# Patient Record
Sex: FEMALE
[De-identification: names, ages, dates, MRNs, and addresses within clinical notes are randomized per-mention and may not be internally consistent; named-entity substitution may affect disease eponyms.]

---

## 2019-12-04 ENCOUNTER — HOSPITAL ENCOUNTER (OUTPATIENT)
Dept: HOSPITAL 92 - BICULT | Age: 34
Discharge: HOME | End: 2019-12-04
Attending: OBSTETRICS & GYNECOLOGY
Payer: COMMERCIAL

## 2019-12-04 DIAGNOSIS — N93.9: Primary | ICD-10-CM

## 2019-12-04 DIAGNOSIS — Z30.09: ICD-10-CM

## 2019-12-04 DIAGNOSIS — N92.1: ICD-10-CM

## 2019-12-04 PROCEDURE — 76856 US EXAM PELVIC COMPLETE: CPT

## 2019-12-04 NOTE — ULT
PELVIC ULTRASOUND:

Abnormal uterine bleeding while ovulating. Patient unable to get pregnant.

 

TECHNIQUE:

Real-time imaging of the pelvis was obtained both transabdominally as well as with an endovaginal pro
be.

 

FINDINGS:

It shows the uterus measuring 3.7 x 4 x 8.2 cm. Endometrium is in the 8 mm range.

 

There are some small follicles involving the adnexa.

 

On Doppler evaluation with spectral analysis, normal flow is shown to both ovaries.

 

No significant free fluid.

 

IMPRESSION:

Unremarkable pelvic ultrasound.

 

POS: EDER

## 2023-05-02 ENCOUNTER — HOSPITAL ENCOUNTER (OUTPATIENT)
Dept: HOSPITAL 92 - ULT | Age: 38
Discharge: HOME | End: 2023-05-02
Attending: FAMILY MEDICINE
Payer: COMMERCIAL

## 2023-05-02 DIAGNOSIS — K80.20: ICD-10-CM

## 2023-05-02 DIAGNOSIS — R10.13: Primary | ICD-10-CM

## 2023-05-02 PROCEDURE — 76705 ECHO EXAM OF ABDOMEN: CPT
